# Patient Record
(demographics unavailable — no encounter records)

---

## 2025-02-21 NOTE — HISTORY OF PRESENT ILLNESS
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   Began feeling lightheaded and dizzy while walking. She stopped to eat but continued to not feel well. She reports she crouched by the side of the building and was found by a bystander. She called her daughter and stated "I don't know where I am. I'm very lightheaded, please pick me up".   Her daughters arrived and they report that Ms. Beck did not know her address, did not know where she was. Cannot recall between 3:50pm and 5:50pm.

## 2025-02-21 NOTE — ASSESSMENT
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   - will order MRI brain with and without contrast - will order MRA head - RTC to review imaging   Patient and patient's family verbalize agreement and understanding with plan of care.   I, Dr. Terrazas, personally performed the evaluation and management (E/M) services for this new patient. That E/M includes conducting the initial examination, assessing all conditions, and establishing the plan of care. Today, my ACP, Dangelo Peralta, was here to observe my evaluation and management services for this patient to be followed going forward.

## 2025-02-21 NOTE — REASON FOR VISIT
[New Patient Visit] : a new patient visit [Referred By: _________] : Patient was referred by NIGEL [Other: _____] : [unfilled] [FreeTextEntry1] : basilar tip aneurysm

## 2025-02-21 NOTE — ASSESSMENT
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   - will order MRI brain with and without contrast - will order MRA head - RTC to review imaging   Patient and patient's family verbalize agreement and understanding with plan of care.   I, Dr. Terrazas, personally performed the evaluation and management (E/M) services for this new patient. That E/M includes conducting the initial examination, assessing all conditions, and establishing the plan of care. Today, my ACP, Dangelo Peralta, was here to observe my evaluation and management services for this patient to be followed going forward.

## 2025-02-21 NOTE — HISTORY OF PRESENT ILLNESS
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   Began feeling lightheaded and dizzy while walking. She stopped to eat but continued to not feel well. She reports she crouched by the side of the building and was found by a bystander. She called her daughter and stated "I don't know where I am. I'm very lightheaded, please pick me up".   Her daughters arrived and they report that Ms. Beck did not know her address, did not know where she was. Cannot recall between 3:50pm and 5:50pm.

## 2025-02-21 NOTE — DATA REVIEWED
[de-identified] : ACC: [id number] EXAM: CT BRAIN STROKE PROTOCOL ORDERED BY: DOROTHEA ROD  PROCEDURE DATE: 02/17/2025    INTERPRETATION: CLINICAL INFORMATION: Stroke Code, transient global amnesia.  COMPARISON: None.  CONTRAST: IV Contrast: NONE .  TECHNIQUE: Serial axial images were obtained from the skull base to the vertex using multi-slice helical technique. Sagittal and coronal reformats were obtained.  FINDINGS:  VENTRICLES AND SULCI: Normal in size and configuration. INTRA-AXIAL: No mass effect, acute hemorrhage, or midline shift. There are periventricular and subcortical white matter hypodensities, consistent with microvascular type changes. EXTRA-AXIAL: No mass or fluid collection. Basal cisterns are normal in appearance.  VISUALIZED SINUSES: Clear. TYMPANOMASTOID CAVITIES: Clear. VISUALIZED ORBITS: Normal. CALVARIUM: Intact.  IMPRESSION: No acute intracranial hemorrhage, mass effect, or midline shift. [de-identified] : ACC: [id number] EXAM: CT ANGIO NECK STROKE PROTCL IC ORDERED BY: DOROTHEA ROD  ACC: [id number] EXAM: CT ANGIO BRAIN STROKE PROTC IC ORDERED BY: DOROTHEA ROD  PROCEDURE DATE: 02/17/2025    INTERPRETATION: PROCEDURE: CTA brain with intravenous contrast.  INDICATION: Stroke code. Amnesia.  TECHNIQUE: Multiple axial thin section were obtained through the [place] following the intravenous bolus injection of 80 cc Isovue-370. 20 cc discarded. MIP series are provided.  COMPARISON: None.  FINDINGS: The internal carotid arteries are patent at the skull base and intracranial compartment without occlusion or high grade stenosis. The anterior and middle cerebral arteries are patent at their 1st and 2nd order segments, and appear symmetric caliber. The posterior circulation shows no high grade stenosis or occlusion. The intracranial vertebral arteries, the basilar artery and both posterior cerebral arteries are patent. Fetal origin of left PCA P1 segment, anatomic variant. Suggestion of 2 x 3 mm saccular outpouching at the basilar tip near the left P1 origin (series 6 image 321, series 10 image 47), concerning for possible aneurysm.  IMPRESSION: No intracranial arterial large vessel occlusion or high-grade stenosis.  Saccular 2 x 3 mm outpouching at the basilar tip concerning for small aneurysm versus less likely infundibulum. The knee and in  ------------------------------------------------------------------------------  PROCEDURE: CTA neck with intravenous contrast.  INDICATION: Stroke code. Amnesia.  TECHNIQUE: Multiple axial thin section were obtained through the neck following the intravenous bolus injection of Isovue-370 as above. MIP series are provided.  COMPARISON: None.  FINDINGS:  The aortic arch is normal size and shows patency, with 3 vessel configuration. Minimal atherosclerotic changes.  Both common carotid arteries are patent up to the bifurcations.  Both internal carotid arteries are patent up to the skull base, without hemodynamically significant stenosis or occlusion. Small calcified plaque at the right ICA proximal portion. Noted tortuosity of the bilateral ICA proximal portions, particularly the left.  The cervical vertebral arteries are patent throughout, without hemodynamically significant stenosis or occlusion. The left vertebral artery arises directly from the aortic arch, anatomic variant, with small calcified plaque at its origin.  IMPRESSION: No hemodynamically significant stenosis or occlusion of either carotid or vertebral artery in the neck.

## 2025-02-21 NOTE — DATA REVIEWED
[de-identified] : ACC: [id number] EXAM: CT BRAIN STROKE PROTOCOL ORDERED BY: DOROTHEA ROD  PROCEDURE DATE: 02/17/2025    INTERPRETATION: CLINICAL INFORMATION: Stroke Code, transient global amnesia.  COMPARISON: None.  CONTRAST: IV Contrast: NONE .  TECHNIQUE: Serial axial images were obtained from the skull base to the vertex using multi-slice helical technique. Sagittal and coronal reformats were obtained.  FINDINGS:  VENTRICLES AND SULCI: Normal in size and configuration. INTRA-AXIAL: No mass effect, acute hemorrhage, or midline shift. There are periventricular and subcortical white matter hypodensities, consistent with microvascular type changes. EXTRA-AXIAL: No mass or fluid collection. Basal cisterns are normal in appearance.  VISUALIZED SINUSES: Clear. TYMPANOMASTOID CAVITIES: Clear. VISUALIZED ORBITS: Normal. CALVARIUM: Intact.  IMPRESSION: No acute intracranial hemorrhage, mass effect, or midline shift. [de-identified] : ACC: [id number] EXAM: CT ANGIO NECK STROKE PROTCL IC ORDERED BY: DOROTHEA ROD  ACC: [id number] EXAM: CT ANGIO BRAIN STROKE PROTC IC ORDERED BY: DOROTHEA ROD  PROCEDURE DATE: 02/17/2025    INTERPRETATION: PROCEDURE: CTA brain with intravenous contrast.  INDICATION: Stroke code. Amnesia.  TECHNIQUE: Multiple axial thin section were obtained through the [place] of [place] following the intravenous bolus injection of 80 cc Isovue-370. 20 cc discarded. MIP series are provided.  COMPARISON: None.  FINDINGS: The internal carotid arteries are patent at the skull base and intracranial compartment without occlusion or high grade stenosis. The anterior and middle cerebral arteries are patent at their 1st and 2nd order segments, and appear symmetric caliber. The posterior circulation shows no high grade stenosis or occlusion. The intracranial vertebral arteries, the basilar artery and both posterior cerebral arteries are patent. Fetal origin of left PCA P1 segment, anatomic variant. Suggestion of 2 x 3 mm saccular outpouching at the basilar tip near the left P1 origin (series 6 image 321, series 10 image 47), concerning for possible aneurysm.  IMPRESSION: No intracranial arterial large vessel occlusion or high-grade stenosis.  Saccular 2 x 3 mm outpouching at the basilar tip concerning for small aneurysm versus less likely infundibulum. The knee and in  ------------------------------------------------------------------------------  PROCEDURE: CTA neck with intravenous contrast.  INDICATION: Stroke code. Amnesia.  TECHNIQUE: Multiple axial thin section were obtained through the neck following the intravenous bolus injection of Isovue-370 as above. MIP series are provided.  COMPARISON: None.  FINDINGS:  The aortic arch is normal size and shows patency, with 3 vessel configuration. Minimal atherosclerotic changes.  Both common carotid arteries are patent up to the bifurcations.  Both internal carotid arteries are patent up to the skull base, without hemodynamically significant stenosis or occlusion. Small calcified plaque at the right ICA proximal portion. Noted tortuosity of the bilateral ICA proximal portions, particularly the left.  The cervical vertebral arteries are patent throughout, without hemodynamically significant stenosis or occlusion. The left vertebral artery arises directly from the aortic arch, anatomic variant, with small calcified plaque at its origin.  IMPRESSION: No hemodynamically significant stenosis or occlusion of either carotid or vertebral artery in the neck.

## 2025-02-21 NOTE — END OF VISIT
[Time Spent: ___ minutes] : I have spent [unfilled] minutes of time on the encounter which excludes teaching and separately reported services. continue lovenox  stroke neurology consult  brain MRV

## 2025-02-21 NOTE — DATA REVIEWED
[de-identified] : ACC: [id number] EXAM: CT BRAIN STROKE PROTOCOL ORDERED BY: DOROTHEA ROD  PROCEDURE DATE: 02/17/2025    INTERPRETATION: CLINICAL INFORMATION: Stroke Code, transient global amnesia.  COMPARISON: None.  CONTRAST: IV Contrast: NONE .  TECHNIQUE: Serial axial images were obtained from the skull base to the vertex using multi-slice helical technique. Sagittal and coronal reformats were obtained.  FINDINGS:  VENTRICLES AND SULCI: Normal in size and configuration. INTRA-AXIAL: No mass effect, acute hemorrhage, or midline shift. There are periventricular and subcortical white matter hypodensities, consistent with microvascular type changes. EXTRA-AXIAL: No mass or fluid collection. Basal cisterns are normal in appearance.  VISUALIZED SINUSES: Clear. TYMPANOMASTOID CAVITIES: Clear. VISUALIZED ORBITS: Normal. CALVARIUM: Intact.  IMPRESSION: No acute intracranial hemorrhage, mass effect, or midline shift. [de-identified] : ACC: [id number] EXAM: CT ANGIO NECK STROKE PROTCL IC ORDERED BY: DOROTHEA ROD  ACC: [id number] EXAM: CT ANGIO BRAIN STROKE PROTC IC ORDERED BY: DOROTHEA ROD  PROCEDURE DATE: 02/17/2025    INTERPRETATION: PROCEDURE: CTA brain with intravenous contrast.  INDICATION: Stroke code. Amnesia.  TECHNIQUE: Multiple axial thin section were obtained through the [place] of [place] following the intravenous bolus injection of 80 cc Isovue-370. 20 cc discarded. MIP series are provided.  COMPARISON: None.  FINDINGS: The internal carotid arteries are patent at the skull base and intracranial compartment without occlusion or high grade stenosis. The anterior and middle cerebral arteries are patent at their 1st and 2nd order segments, and appear symmetric caliber. The posterior circulation shows no high grade stenosis or occlusion. The intracranial vertebral arteries, the basilar artery and both posterior cerebral arteries are patent. Fetal origin of left PCA P1 segment, anatomic variant. Suggestion of 2 x 3 mm saccular outpouching at the basilar tip near the left P1 origin (series 6 image 321, series 10 image 47), concerning for possible aneurysm.  IMPRESSION: No intracranial arterial large vessel occlusion or high-grade stenosis.  Saccular 2 x 3 mm outpouching at the basilar tip concerning for small aneurysm versus less likely infundibulum. The knee and in  ------------------------------------------------------------------------------  PROCEDURE: CTA neck with intravenous contrast.  INDICATION: Stroke code. Amnesia.  TECHNIQUE: Multiple axial thin section were obtained through the neck following the intravenous bolus injection of Isovue-370 as above. MIP series are provided.  COMPARISON: None.  FINDINGS:  The aortic arch is normal size and shows patency, with 3 vessel configuration. Minimal atherosclerotic changes.  Both common carotid arteries are patent up to the bifurcations.  Both internal carotid arteries are patent up to the skull base, without hemodynamically significant stenosis or occlusion. Small calcified plaque at the right ICA proximal portion. Noted tortuosity of the bilateral ICA proximal portions, particularly the left.  The cervical vertebral arteries are patent throughout, without hemodynamically significant stenosis or occlusion. The left vertebral artery arises directly from the aortic arch, anatomic variant, with small calcified plaque at its origin.  IMPRESSION: No hemodynamically significant stenosis or occlusion of either carotid or vertebral artery in the neck.

## 2025-02-21 NOTE — HISTORY OF PRESENT ILLNESS
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   Began feeling lightheaded and dizzy while walking. She stopped to eat but continued to not feel well. She reports she crouched by the side of the building and was found by a bystander. She called her daughter and stated "I don't know where I am. I'm very lightheaded, please pick me up".   Her daughters arrived and they report that Ms. Beck did not know her address, did not know where she was. Cannot recall between 3:50pm and 5:50pm.

## 2025-02-21 NOTE — ASSESSMENT
[FreeTextEntry1] : 64 year old female who initially present to 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   - will order MRI brain with and without contrast - will order MRA head - RTC to review imaging   Patient and patient's family verbalize agreement and understanding with plan of care.   I, Dr. Terrazas, personally performed the evaluation and management (E/M) services for this new patient. That E/M includes conducting the initial examination, assessing all conditions, and establishing the plan of care. Today, my ACP, Dangelo Peralta, was here to observe my evaluation and management services for this patient to be followed going forward.

## 2025-03-03 NOTE — REASON FOR VISIT
[Follow-Up: _____] : a [unfilled] follow-up visit [Home] : at home, [unfilled] , at the time of the visit. [Medical Office: ([facility])___] : at the medical office located in  [Telehealth (audio & video)] : This visit was provided via telehealth using real-time 2-way audio visual technology. [Verbal consent obtained from patient] : the patient, [unfilled] [FreeTextEntry1] : here to review MRI brain with and without and MRA without @ [facility] 2/27/25

## 2025-03-03 NOTE — ASSESSMENT
[FreeTextEntry1] : 64 year old female who initially present to 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 Presents today to review MRI brain with and without as well as MRA head. MRA reveals patulous basilar tip, no definite aneurysm noted.  - repeat MRA head in 1 year - will refer for upper extremity EMG testing for persistent right hand numbness/tingling as well as MRI cervical spine noncon - neurooptho referral for vision changes - RTC after MRI cervical spine and EMG performed  Patient and patient's family verbalize agreement and understanding with plan of care.  I, Dr. Alcides Terarzas, personally performed the evaluation and management (E/M) services for this established patient who presents today with (a) new problem(s)/exacerbation of (an) existing condition(s). That E/M includes conducting the clinically appropriate interval history &/or exam, assessing all new/exacerbated conditions, and establishing a new plan of care. Today, my MARLON, Dangelo Peralta, was here to observe my evaluation and management service for this new problem/exacerbated condition and follow the plan of care established by me going forward.

## 2025-03-03 NOTE — HISTORY OF PRESENT ILLNESS
[FreeTextEntry1] : 64 year old female with reportedly no PMH, former smoker, recently presented to 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  Began feeling lightheaded and dizzy while walking. She stopped to eat but continued to not feel well. She reports she crouched by the side of the building and was found by a bystander. She called her daughter and stated "I don't know where I am. I'm very lightheaded, please pick me up".  Her daughters arrived and they report that Ms. Beck did not know her address, did not know where she was. Cannot recall between 3:50pm and 5:50pm.  TODAY: here to review MRI brain w and w/o as well as MRA head.  She reports she has had several episodes where she sees black spots and will nearly syncopize. She also states she has been experiencing right hand tingling.

## 2025-03-03 NOTE — HISTORY OF PRESENT ILLNESS
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  Began feeling lightheaded and dizzy while walking. She stopped to eat but continued to not feel well. She reports she crouched by the side of the building and was found by a bystander. She called her daughter and stated "I don't know where I am. I'm very lightheaded, please pick me up".  Her daughters arrived and they report that Ms. Beck did not know her address, did not know where she was. Cannot recall between 3:50pm and 5:50pm.  TODAY: here to review MRI brain w and w/o as well as MRA head.  She reports she has had several episodes where she sees black spots and will nearly syncopize. She also states she has been experiencing right hand tingling.

## 2025-03-03 NOTE — ASSESSMENT
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 Presents today to review MRI brain with and without as well as MRA head. MRA reveals patulous basilar tip, no definite aneurysm noted.  - repeat MRA head in 1 year - will refer for upper extremity EMG testing for persistent right hand numbness/tingling as well as MRI cervical spine noncon - neurooptho referral for vision changes - RTC after MRI cervical spine and EMG performed  Patient and patient's family verbalize agreement and understanding with plan of care.  I, Dr. Alcides Terrazas, personally performed the evaluation and management (E/M) services for this established patient who presents today with (a) new problem(s)/exacerbation of (an) existing condition(s). That E/M includes conducting the clinically appropriate interval history &/or exam, assessing all new/exacerbated conditions, and establishing a new plan of care. Today, my MARLON, Dangelo Peralta, was here to observe my evaluation and management service for this new problem/exacerbated condition and follow the plan of care established by me going forward.

## 2025-03-03 NOTE — DATA REVIEWED
[de-identified] : MRI BRAIN WITHOUT AND WITH CONTRAST @ [facility] 2/27/25  HISTORY:  Tingling.  TECHNIQUE: Magnetic resonance imaging was performed with axial T1-weighted images, axial and sagittal FLAIR images, axial fast spin-echo T2-weighted images, diffusion weighted axial images, gradient echo axial images, and postcontrast axial and coronal T1-weighted images on a 1.5T MR unit.  IV Contrast:  11 ml of Clariscan was injected from a 15 ml single use vial.  COMPARISON:  None.  FINDINGS:   Age appropriate ventricular and sulcal size.  No evidence of mass effect, midline shift, acute territorial infarct, acute intracranial hemorrhage, or extra-axial collection.  Flow voids within the dominant cerebral arteries are preserved.  No abnormal intracranial enhancement.  No evidence for small vessel ischemic changes. No dominant calvarial lesion. Scattered minimal paranasal sinus mucoperiosteal thickening.  Additional small right maxillary sinus mucous retention cyst or polyp. Normally aerated mastoid air cells. Visualized orbital structures are unremarkable.  IMPRESSION:  1.  No acute intracranial hemorrhage or mass effect. No acute territorial infarct. 2.  Scattered minimal paranasal sinus mucoperiosteal thickening.  3.  Additional small right maxillary sinus mucous retention cyst or polyp.  Thank you for the opportunity to participate in the care of this patient.     KYAW RANGEL MD  - Electronically Signed: 02- 10:21 AM  Physician to Physician Direct Line is: [phone] [de-identified] : MR ANGIOGRAPHY BRAIN WITHOUT CONTRAST @ [facility] 2/27/25  HISTORY:  Aneurysm  TECHNIQUE:  Magnetic resonance angiography of intracranial circulation was performed on a 1.5T MR unit centered at the [place] of [place] with three-dimensional time of flight technique and targeted maximum intensity projection reconstructions.   COMPARISON:  None.  FINDINGS:    Anterior circulation: No evidence for large vessel occlusion, high grade stenosis, or large aneurysm. Posterior circulation: 4 mm patulous basilar tip. No evidence for large vessel occlusion, high grade stenosis, or large aneurysm.  IMPRESSION:  4 mm patulous basilar tip.  Thank you for the opportunity to participate in the care of this patient.     KYAW RANGEL MD  - Electronically Signed: 02- 10:23 AM  Physician to Physician Direct Line is: [phone]

## 2025-03-03 NOTE — DATA REVIEWED
[de-identified] : MRI BRAIN WITHOUT AND WITH CONTRAST @ [facility] 2/27/25  HISTORY:  Tingling.  TECHNIQUE: Magnetic resonance imaging was performed with axial T1-weighted images, axial and sagittal FLAIR images, axial fast spin-echo T2-weighted images, diffusion weighted axial images, gradient echo axial images, and postcontrast axial and coronal T1-weighted images on a 1.5T MR unit.  IV Contrast:  11 ml of Clariscan was injected from a 15 ml single use vial.  COMPARISON:  None.  FINDINGS:   Age appropriate ventricular and sulcal size.  No evidence of mass effect, midline shift, acute territorial infarct, acute intracranial hemorrhage, or extra-axial collection.  Flow voids within the dominant cerebral arteries are preserved.  No abnormal intracranial enhancement.  No evidence for small vessel ischemic changes. No dominant calvarial lesion. Scattered minimal paranasal sinus mucoperiosteal thickening.  Additional small right maxillary sinus mucous retention cyst or polyp. Normally aerated mastoid air cells. Visualized orbital structures are unremarkable.  IMPRESSION:  1.  No acute intracranial hemorrhage or mass effect. No acute territorial infarct. 2.  Scattered minimal paranasal sinus mucoperiosteal thickening.  3.  Additional small right maxillary sinus mucous retention cyst or polyp.  Thank you for the opportunity to participate in the care of this patient.     KYAW RANGEL MD  - Electronically Signed: 02- 10:21 AM  Physician to Physician Direct Line is: [phone] [de-identified] : MR ANGIOGRAPHY BRAIN WITHOUT CONTRAST @ [facility] 2/27/25  HISTORY:  Aneurysm  TECHNIQUE:  Magnetic resonance angiography of intracranial circulation was performed on a 1.5T MR unit centered at the [place] of [place] with three-dimensional time of flight technique and targeted maximum intensity projection reconstructions.   COMPARISON:  None.  FINDINGS:    Anterior circulation: No evidence for large vessel occlusion, high grade stenosis, or large aneurysm. Posterior circulation: 4 mm patulous basilar tip. No evidence for large vessel occlusion, high grade stenosis, or large aneurysm.  IMPRESSION:  4 mm patulous basilar tip.  Thank you for the opportunity to participate in the care of this patient.     KYAW RANGEL MD  - Electronically Signed: 02- 10:23 AM  Physician to Physician Direct Line is: [phone]

## 2025-03-03 NOTE — REASON FOR VISIT
[Follow-Up: _____] : a [unfilled] follow-up visit [Home] : at home, [unfilled] , at the time of the visit. [Medical Office: ([facility])___] : at the medical office located in  [Telehealth (audio & video)] : This visit was provided via telehealth using real-time 2-way audio visual technology. [Verbal consent obtained from patient] : the patient, [unfilled] [FreeTextEntry1] : here to review MRI brain with and without and MRA without @ [facility] 2/27/25

## 2025-03-06 NOTE — REASON FOR VISIT
[Post Hospitalization] : a post hospitalization visit
[Post Hospitalization] : a post hospitalization visit
Yes - the patient is able to be screened

## 2025-03-07 NOTE — ASSESSMENT
[FreeTextEntry1] : Rachel Lake is a 64 year old female with PMH of nephrolithiasis, alopecia and recent former smoker who presents for ED follow up after presenting to 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  Plan: -Continue f/u with Dr. Terrazas for basilar abnormality -24 hr EEG to r/o seizure in setting of possible TGA -Complete MRI C-spine & EMG per Dr. Terrazas for RUE tingling, likely carpal tunnel vs. cervical radiculopathy -F/u with cardiology for syncope work up including TTE and heart monitor, ?vasovagal -Consider B12, folate, MMA and homocysteine to r/o underlying B12 deficiency contributing to lightheadedness -Encouraged healthy lifestyle habits including regular exercise, goal of 8 hours of sleep, adequate hydration and stress management -Counselled on signs of stroke BEFAST and to call 911 with any new or worsening neurological symptoms -RTO in 6 months with Dr. Pelayo per patient preference

## 2025-03-07 NOTE — PHYSICAL EXAM
[FreeTextEntry1] : Alert. Fully oriented. Speech and language are intact. Cranial nerves II-XII are intact. No nystagmus noted. Motor exam reveals intact strength with individual muscle testing in bilateral upper and lower extremities. No drift. Tone is normal. Sensation is intact to light touch in distal extremities. Finger-to-nose is intact. Rapid alternating movements are normal in the upper and lower extremities. Gait is normal.

## 2025-03-07 NOTE — HISTORY OF PRESENT ILLNESS
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  [facility] ED 25: CTA H/N: No stenosis. Saccular 2x3 mm outpouching at the basilar tip concerning small aneurysm vs. infundibulum.  HCT: Negative.  Since discharge, patient reports no new symptoms such as headache, dizziness, visual changes, speech disturbances, falls, facial asymmetry or weakness. /77. She reports intermittent right hand numbness/tingling that worsens at nighttime, which is pending work up with EMG and MRI C-spine by Dr. Terrazas for cervical radiculopathy vs. carpal tunnel. MRI head from 25 without stroke or significant . She reports a few episodes of faint feeling with floaters/fading of her vision, which improve after laying down as well as intermittent lightheadedness feeling. She reports previously drinking 2-3 drinks per night but now only drinks one glass of wine per week. She is a former smoker (on/off), averaging about 6 cigarettes per day since she was 21 but recently stopped the second week of January. She reports no recent blood work and is upset due to frequent doctor's visits.

## 2025-03-07 NOTE — ASSESSMENT
[FreeTextEntry1] : Rachel Lake is a 64 year old female with PMH of nephrolithiasis, alopecia and recent former smoker who presents for ED follow up after presenting to 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  Plan: -Continue f/u with Dr. Terrazas for basilar abnormality -24 hr EEG to r/o seizure in setting of possible TGA -Complete MRI C-spine & EMG per Dr. Terrazas for RUE tingling, likely carpal tunnel vs. cervical radiculopathy -F/u with cardiology for syncope work up including TTE and heart monitor, ?vasovagal -Consider B12, folate, MMA and homocysteine to r/o underlying B12 deficiency contributing to lightheadedness -Encouraged healthy lifestyle habits including regular exercise, goal of 8 hours of sleep, adequate hydration and stress management -Counselled on signs of stroke BEFAST and to call 911 with any new or worsening neurological symptoms -RTO in 6 months with Dr. Pelayo per patient preference

## 2025-03-07 NOTE — HISTORY OF PRESENT ILLNESS
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  [facility] ED 25: CTA H/N: No stenosis. Saccular 2x3 mm outpouching at the basilar tip concerning small aneurysm vs. infundibulum.  HCT: Negative.  Since discharge, patient reports no new symptoms such as headache, dizziness, visual changes, speech disturbances, falls, facial asymmetry or weakness. /77. She reports intermittent right hand numbness/tingling that worsens at nighttime, which is pending work up with EMG and MRI C-spine by Dr. Terrazas for cervical radiculopathy vs. carpal tunnel. MRI head from 25 without stroke or significant . She reports a few episodes of faint feeling with floaters/fading of her vision, which improve after laying down as well as intermittent lightheadedness feeling. She reports previously drinking 2-3 drinks per night but now only drinks one glass of wine per week. She is a former smoker (on/off), averaging about 6 cigarettes per day since she was 21 but recently stopped the second week of January. She reports no recent blood work and is upset due to frequent doctor's visits.

## 2025-03-19 NOTE — HISTORY OF PRESENT ILLNESS
[FreeTextEntry1] :  Ms. NATALEE CHILDS is a very pleasant RHD 64-year female who comes in for evaluation of right-hand pain that has been ongoing for 3 months  without any specific injury or inciting event. The pain is located primarily whole hand intermittent in nature and described as tingling. The pain is rated as 0/10 during today's visit, and ranges from 0/10. The patient's symptoms are aggravated by nighttime and alleviated by rest. The patient denies any night pain, numbness/tingling, weakness, or bowel/bladder dysfunction. The patient has no other complaints at this time.  MRI cervical: Multilevel degenerative changes throughout the cervical spine, as detailed above, resulting in up to mild central canal narrowing at C5-C6 and C6-C7. Multilevel neural foraminal narrowing, as above. of note she was found to have a basilar tip aneurysm on CTA  had some amnesia and dizzyness she works as an   not dropping things  not waking up in pain  not clumsy

## 2025-03-19 NOTE — PHYSICAL EXAM
[FreeTextEntry1] : PHYSICAL EXAM : OBJECTIVE   GENERAL : Awake ,alert and oriented to time place and person  HEAD : normocephalic and atraumatic  NECK : supple ,no tracheal deviation ,no thyroid enlargement noted with swallowing EYES : sclera and conjunctiva normal no redness,intact extraocular movements  ENT  : ears and nose normal in appearance -hearing adequate  PULMONARY: effort normal. No respiratory distress. breathing regular. No wheezes  LYMPH : No swelling in limbs, capillary return within normal range  CVS : warm extremities,no palpitations,not short of breath, no visible jugular venous distention PSYCH : mood and affect normal ,good eye contact ,normal attention  ABDOMEN : no visible distension ,  NEUROLOGICAL:cranial nerves intact,muscle tone normal,gait and balance safe except where noted below  SKIN : warm and dry No rash detected over specific body areas examined  MUSCULOSKELETAL: normal muscle bulk, no focal bony tenderness /posture normal except where specified below neg tinels  CMC OA clemencia  hebedens nodes cee R index +++ ROM full  sensation intact  MMT 5/5

## 2025-03-19 NOTE — REVIEW OF SYSTEMS
[Patient Intake Form Reviewed] : Patient intake form was reviewed [Fever] : no fever [Chills] : no chills [Joint Stiffness] : no joint stiffness [Muscle Weakness] : no muscle weakness [Difficulty Walking] : no difficulty walking

## 2025-03-19 NOTE — REASON FOR VISIT
[Initial Evaluation] : an initial evaluation [FreeTextEntry1] : for R hand pain referred by Dr. Terrazas

## 2025-03-26 NOTE — PHYSICAL EXAM
[Well Developed] : well developed [Well Nourished] : well nourished [No Acute Distress] : no acute distress [Normal Conjunctiva] : normal conjunctiva [Normal Venous Pressure] : normal venous pressure [No Carotid Bruit] : no carotid bruit [Normal S1, S2] : normal S1, S2 [No Murmur] : no murmur [No Rub] : no rub [No Gallop] : no gallop [Clear Lung Fields] : clear lung fields [Good Air Entry] : good air entry [No Respiratory Distress] : no respiratory distress  [Soft] : abdomen soft [Non Tender] : non-tender [No Masses/organomegaly] : no masses/organomegaly [Normal Bowel Sounds] : normal bowel sounds [Normal Gait] : normal gait [No Edema] : no edema [No Cyanosis] : no cyanosis [No Clubbing] : no clubbing [Moves all extremities] : moves all extremities [No Focal Deficits] : no focal deficits [Normal Speech] : normal speech [Alert and Oriented] : alert and oriented [Normal memory] : normal memory [de-identified] : multiple nevi

## 2025-03-26 NOTE — DISCUSSION/SUMMARY
[FreeTextEntry1] : stable exam syncope/cp- r/o cardiac, check Holter, f/u for est and echo eval [EKG obtained to assist in diagnosis and management of assessed problem(s)] : EKG obtained to assist in diagnosis and management of assessed problem(s)

## 2025-03-27 NOTE — PROCEDURE
[de-identified] : EMG /NERVE CONDUCTION STUDY performed today without complication  Tabulated data, wave forms , conclusions and recommendations are attached and in the procedure report.  Please refer to the scanned study attached to this encounter  Full history and focused clinical exam performed prior to the examination and documented in report

## 2025-03-28 NOTE — DATA REVIEWED
[de-identified] : 	@ PACS 3/14/25 EXAM: [id number] - MR SPINE CERVICAL  - ORDERED BY: ANDREA BURDEN   PROCEDURE DATE:  03/14/2025    INTERPRETATION:  MRI of the cervical spine  History: Neck pain, evaluate for cord compression  Technique:  Multiplanar, multi sequential images of the cervical spine were obtained without contrast using a standard protocol.  Prior study: None available  Findings:  Bone: No acute fracture.  Alignment: Reversal of the normal cervical lordosis. Mild anterolisthesis of C3 on C4. Trace retrolisthesis of C5 on C6 and C6 on C7. Trace anterolisthesis of C7 on T1 and T1 on T2.  Disc spaces: Multilevel degenerative disc disease throughout the cervical spine with areas of degenerative endplate changes.  Spinal cord:  No definite cord signal abnormality.  Paraspinal/Prevertebral soft tissues: Unremarkable.  Evaluation of the individual motion segments demonstrates the following:  C2/3 level: Minimal disc bulge. Moderate left and mild right facet arthrosis. Mild left and no right neural foraminal narrowing. No central canal stenosis.  C3/4 level: Disc bulge with disc osteophyte complex. Fusion of the left facet joints with productive bone formation. Mild bilateral uncovertebral joint hypertrophy. Mild/moderate left and no right neural foraminal narrowing. No central canal stenosis.  C4/5 level: Disc bulge with disc osteophyte complex. Mild bilateral facet arthrosis. Moderate bilateral uncovertebral joint hypertrophy. Mild right and minimal left neural foraminal narrowing. No central canal stenosis.  C5/6 level: Disc bulge with disc osteophyte complex. Mild bilateral facet arthrosis. Severe left and moderate right uncovertebral joint hypertrophy. Mild right and moderate/severe left neural foraminal narrowing. Mild central canal stenosis.  C6/7 level: Disc bulge with disc osteophyte complex. Mild bilateral facet arthrosis. Severe bilateral uncovertebral joint hypertrophy. Moderate bilateral neural foraminal narrowing. Mild central canal stenosis.  C7/T1 level: Disc bulge with disc osteophyte complex. Mild bilateral facet arthrosis. No canal or neural foraminal stenosis.  Impression:  Multilevel degenerative changes throughout the cervical spine, as detailed above, resulting in up to mild central canal narrowing at C5-C6 and C6-C7. Multilevel neural foraminal narrowing, as above.

## 2025-03-28 NOTE — DATA REVIEWED
[de-identified] : 	@ PACS 3/14/25 EXAM: [id number] - MR SPINE CERVICAL  - ORDERED BY: ANDREA BURDEN   PROCEDURE DATE:  03/14/2025    INTERPRETATION:  MRI of the cervical spine  History: Neck pain, evaluate for cord compression  Technique:  Multiplanar, multi sequential images of the cervical spine were obtained without contrast using a standard protocol.  Prior study: None available  Findings:  Bone: No acute fracture.  Alignment: Reversal of the normal cervical lordosis. Mild anterolisthesis of C3 on C4. Trace retrolisthesis of C5 on C6 and C6 on C7. Trace anterolisthesis of C7 on T1 and T1 on T2.  Disc spaces: Multilevel degenerative disc disease throughout the cervical spine with areas of degenerative endplate changes.  Spinal cord:  No definite cord signal abnormality.  Paraspinal/Prevertebral soft tissues: Unremarkable.  Evaluation of the individual motion segments demonstrates the following:  C2/3 level: Minimal disc bulge. Moderate left and mild right facet arthrosis. Mild left and no right neural foraminal narrowing. No central canal stenosis.  C3/4 level: Disc bulge with disc osteophyte complex. Fusion of the left facet joints with productive bone formation. Mild bilateral uncovertebral joint hypertrophy. Mild/moderate left and no right neural foraminal narrowing. No central canal stenosis.  C4/5 level: Disc bulge with disc osteophyte complex. Mild bilateral facet arthrosis. Moderate bilateral uncovertebral joint hypertrophy. Mild right and minimal left neural foraminal narrowing. No central canal stenosis.  C5/6 level: Disc bulge with disc osteophyte complex. Mild bilateral facet arthrosis. Severe left and moderate right uncovertebral joint hypertrophy. Mild right and moderate/severe left neural foraminal narrowing. Mild central canal stenosis.  C6/7 level: Disc bulge with disc osteophyte complex. Mild bilateral facet arthrosis. Severe bilateral uncovertebral joint hypertrophy. Moderate bilateral neural foraminal narrowing. Mild central canal stenosis.  C7/T1 level: Disc bulge with disc osteophyte complex. Mild bilateral facet arthrosis. No canal or neural foraminal stenosis.  Impression:  Multilevel degenerative changes throughout the cervical spine, as detailed above, resulting in up to mild central canal narrowing at C5-C6 and C6-C7. Multilevel neural foraminal narrowing, as above.

## 2025-03-28 NOTE — REASON FOR VISIT
[Follow-Up: _____] : a [unfilled] follow-up visit [Home] : at home, [unfilled] , at the time of the visit. [Medical Office: ([facility])___] : at the medical office located in  [Telephone (audio)] : This telephonic visit was provided via audio only technology. [Technical] : patient unable to effectively utilize tele-video due to technical issues. [Verbal consent obtained from patient] : the patient, [unfilled]

## 2025-03-28 NOTE — HISTORY OF PRESENT ILLNESS
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  Began feeling lightheaded and dizzy while walking. She stopped to eat but continued to not feel well. She reports she crouched by the side of the building and was found by a bystander. She called her daughter and stated "I don't know where I am. I'm very lightheaded, please pick me up".  Her daughters arrived and they report that Ms. Beck did not know her address, did not know where she was. Cannot recall between 3:50pm and 5:50pm.  3/3/25: here to review MRI brain w and w/o as well as MRA head. She reports she has had several episodes where she sees black spots and will nearly lose consciousness. She also states she has been experiencing right hand tingling. Ordered for EMG as well as MRI cervical spine  TODAY: reviewed MRI cervical as well as EMG findings.

## 2025-03-28 NOTE — HISTORY OF PRESENT ILLNESS
[FreeTextEntry1] : 64 year old female with reportedly no PMH, former smoker, recently presented to 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  Began feeling lightheaded and dizzy while walking. She stopped to eat but continued to not feel well. She reports she crouched by the side of the building and was found by a bystander. She called her daughter and stated "I don't know where I am. I'm very lightheaded, please pick me up".  Her daughters arrived and they report that Ms. Beck did not know her address, did not know where she was. Cannot recall between 3:50pm and 5:50pm.  3/3/25: here to review MRI brain w and w/o as well as MRA head. She reports she has had several episodes where she sees black spots and will nearly lose consciousness. She also states she has been experiencing right hand tingling. Ordered for EMG as well as MRI cervical spine  TODAY: reviewed MRI cervical as well as EMG findings.

## 2025-03-28 NOTE — REASON FOR VISIT
[Follow-Up: _____] : a [unfilled] follow-up visit [Home] : at home, [unfilled] , at the time of the visit. [Medical Office: ([facility])___] : at the medical office located in  [Telephone (audio)] : This telephonic visit was provided via audio only technology. [Technical] : patient unable to effectively utilize tele-video due to technical issues. [Verbal consent obtained from patient] : the patient, [unfilled]

## 2025-03-28 NOTE — ASSESSMENT
[FreeTextEntry1] : DDD in cervical spine, no surgical intervention. Discussed her EMG findings of R > L advanced carpal tunnel disease. Patient expresses that she can tolerate the tingling in her hand currently and return for follow up if she decides to undergo CTR surgery.  - repeat MRA head in 1 year for patulous basilar tip (2/2026)  Patient and patient's family verbalize agreement and understanding with plan of care.  I, Dr. Alcides Terrazas, personally performed the evaluation and management (E/M) services for this established patient who presents today with (a) new problem(s)/exacerbation of (an) existing condition(s). That E/M includes conducting the clinically appropriate interval history &/or exam, assessing all new/exacerbated conditions, and establishing a new plan of care. Today, my MARLON, Dangelo Peralta, was here to observe my evaluation and management service for this new problem/exacerbated condition and follow the plan of care established by me going forward.

## 2025-03-28 NOTE — HISTORY OF PRESENT ILLNESS
[FreeTextEntry1] : 64 year old female with reportedly no PMH, former smoker, recently presented to 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  Began feeling lightheaded and dizzy while walking. She stopped to eat but continued to not feel well. She reports she crouched by the side of the building and was found by a bystander. She called her daughter and stated "I don't know where I am. I'm very lightheaded, please pick me up".  Her daughters arrived and they report that Ms. Beck did not know her address, did not know where she was. Cannot recall between 3:50pm and 5:50pm.  3/3/25: here to review MRI brain w and w/o as well as MRA head. She reports she has had several episodes where she sees black spots and will nearly lose consciousness. She also states she has been experiencing right hand tingling. Ordered for EMG as well as MRI cervical spine  TODAY: reviewed MRI cervical as well as EMG findings.

## 2025-03-28 NOTE — REASON FOR VISIT
[Follow-Up: _____] : a [unfilled] follow-up visit [Home] : at home, [unfilled] , at the time of the visit. [Medical Office: ([facility])___] : at the medical office located in  [Telephone (audio)] : This telephonic visit was provided via audio only technology. [Technical] : patient unable to effectively utilize tele-video due to technical issues. [Verbal consent obtained from patient] : the patient, [unfilled]

## 2025-03-28 NOTE — DATA REVIEWED
[de-identified] : 	@ PACS 3/14/25 EXAM: [id number] - MR SPINE CERVICAL  - ORDERED BY: ANDREA BURDEN   PROCEDURE DATE:  03/14/2025    INTERPRETATION:  MRI of the cervical spine  History: Neck pain, evaluate for cord compression  Technique:  Multiplanar, multi sequential images of the cervical spine were obtained without contrast using a standard protocol.  Prior study: None available  Findings:  Bone: No acute fracture.  Alignment: Reversal of the normal cervical lordosis. Mild anterolisthesis of C3 on C4. Trace retrolisthesis of C5 on C6 and C6 on C7. Trace anterolisthesis of C7 on T1 and T1 on T2.  Disc spaces: Multilevel degenerative disc disease throughout the cervical spine with areas of degenerative endplate changes.  Spinal cord:  No definite cord signal abnormality.  Paraspinal/Prevertebral soft tissues: Unremarkable.  Evaluation of the individual motion segments demonstrates the following:  C2/3 level: Minimal disc bulge. Moderate left and mild right facet arthrosis. Mild left and no right neural foraminal narrowing. No central canal stenosis.  C3/4 level: Disc bulge with disc osteophyte complex. Fusion of the left facet joints with productive bone formation. Mild bilateral uncovertebral joint hypertrophy. Mild/moderate left and no right neural foraminal narrowing. No central canal stenosis.  C4/5 level: Disc bulge with disc osteophyte complex. Mild bilateral facet arthrosis. Moderate bilateral uncovertebral joint hypertrophy. Mild right and minimal left neural foraminal narrowing. No central canal stenosis.  C5/6 level: Disc bulge with disc osteophyte complex. Mild bilateral facet arthrosis. Severe left and moderate right uncovertebral joint hypertrophy. Mild right and moderate/severe left neural foraminal narrowing. Mild central canal stenosis.  C6/7 level: Disc bulge with disc osteophyte complex. Mild bilateral facet arthrosis. Severe bilateral uncovertebral joint hypertrophy. Moderate bilateral neural foraminal narrowing. Mild central canal stenosis.  C7/T1 level: Disc bulge with disc osteophyte complex. Mild bilateral facet arthrosis. No canal or neural foraminal stenosis.  Impression:  Multilevel degenerative changes throughout the cervical spine, as detailed above, resulting in up to mild central canal narrowing at C5-C6 and C6-C7. Multilevel neural foraminal narrowing, as above.

## 2025-05-21 NOTE — PHYSICAL EXAM
[Well Developed] : well developed [Well Nourished] : well nourished [No Acute Distress] : no acute distress [Normal Conjunctiva] : normal conjunctiva [Normal Venous Pressure] : normal venous pressure [No Carotid Bruit] : no carotid bruit [Normal S1, S2] : normal S1, S2 [No Murmur] : no murmur [No Rub] : no rub [No Gallop] : no gallop [Clear Lung Fields] : clear lung fields [Good Air Entry] : good air entry [No Respiratory Distress] : no respiratory distress  [Soft] : abdomen soft [Non Tender] : non-tender [No Masses/organomegaly] : no masses/organomegaly [Normal Bowel Sounds] : normal bowel sounds [Normal Gait] : normal gait [No Edema] : no edema [No Cyanosis] : no cyanosis [No Clubbing] : no clubbing [Moves all extremities] : moves all extremities [No Focal Deficits] : no focal deficits [Normal Speech] : normal speech [Alert and Oriented] : alert and oriented [Normal memory] : normal memory [de-identified] : multiple nevi

## 2025-05-21 NOTE — DISCUSSION/SUMMARY
[FreeTextEntry1] : stable exam negative non invasive cardiac evaluation/ results discussed TAA/ mixed valve disease- echo discussed check repeat 1 year needs AAA creening
